# Patient Record
Sex: FEMALE | Race: AMERICAN INDIAN OR ALASKA NATIVE
[De-identification: names, ages, dates, MRNs, and addresses within clinical notes are randomized per-mention and may not be internally consistent; named-entity substitution may affect disease eponyms.]

---

## 2017-12-22 ENCOUNTER — HOSPITAL ENCOUNTER (OUTPATIENT)
Dept: HOSPITAL 5 - TRG | Age: 27
Discharge: HOME | End: 2017-12-22
Attending: OBSTETRICS & GYNECOLOGY
Payer: MEDICAID

## 2017-12-22 VITALS — DIASTOLIC BLOOD PRESSURE: 74 MMHG | SYSTOLIC BLOOD PRESSURE: 136 MMHG

## 2017-12-22 DIAGNOSIS — Z3A.39: ICD-10-CM

## 2017-12-22 DIAGNOSIS — Z87.891: ICD-10-CM

## 2017-12-22 DIAGNOSIS — O47.1: Primary | ICD-10-CM

## 2017-12-22 PROCEDURE — 59025 FETAL NON-STRESS TEST: CPT

## 2017-12-26 ENCOUNTER — HOSPITAL ENCOUNTER (INPATIENT)
Dept: HOSPITAL 5 - TRG | Age: 27
LOS: 3 days | Discharge: HOME | End: 2017-12-29
Attending: OBSTETRICS & GYNECOLOGY | Admitting: OBSTETRICS & GYNECOLOGY
Payer: MEDICAID

## 2017-12-26 DIAGNOSIS — Z3A.40: ICD-10-CM

## 2017-12-26 DIAGNOSIS — Z23: ICD-10-CM

## 2017-12-26 DIAGNOSIS — E66.9: ICD-10-CM

## 2017-12-26 LAB
ALT SERPL-CCNC: 9 UNITS/L (ref 7–56)
BILIRUB UR QL STRIP: (no result)
BLOOD UR QL VISUAL: (no result)
HCT VFR BLD CALC: (no result) % (ref 30.3–42.9)
HCT VFR BLD CALC: 32.9 % (ref 30.3–42.9)
HGB BLD-MCNC: (no result) GM/DL (ref 10.1–14.3)
HGB BLD-MCNC: 10.6 GM/DL (ref 10.1–14.3)
MCH RBC QN AUTO: (no result) PG (ref 28–32)
MCH RBC QN AUTO: 27 PG (ref 28–32)
MCHC RBC AUTO-ENTMCNC: (no result) % (ref 30–34)
MCHC RBC AUTO-ENTMCNC: 32 % (ref 30–34)
MCV RBC AUTO: (no result) FL (ref 79–97)
MCV RBC AUTO: 85 FL (ref 79–97)
MUCOUS THREADS #/AREA URNS HPF: (no result) /HPF
NITRITE UR QL STRIP: (no result)
PH UR STRIP: 6 [PH] (ref 5–7)
PLATELET # BLD: (no result) K/MM3 (ref 140–440)
PLATELET # BLD: 168 K/MM3 (ref 140–440)
RBC # BLD AUTO: (no result) M/MM3 (ref 3.65–5.03)
RBC # BLD AUTO: 3.87 M/MM3 (ref 3.65–5.03)
RBC #/AREA URNS HPF: 4 /HPF (ref 0–6)
URATE SERPL-MCNC: 5.8 MG/DL (ref 3.5–7.6)
UROBILINOGEN UR-MCNC: < 2 MG/DL (ref ?–2)
WBC #/AREA URNS HPF: 5 /HPF (ref 0–6)

## 2017-12-26 PROCEDURE — 85018 HEMOGLOBIN: CPT

## 2017-12-26 PROCEDURE — G0463 HOSPITAL OUTPT CLINIC VISIT: HCPCS

## 2017-12-26 PROCEDURE — 85027 COMPLETE CBC AUTOMATED: CPT

## 2017-12-26 PROCEDURE — 76819 FETAL BIOPHYS PROFIL W/O NST: CPT

## 2017-12-26 PROCEDURE — 82565 ASSAY OF CREATININE: CPT

## 2017-12-26 PROCEDURE — 84550 ASSAY OF BLOOD/URIC ACID: CPT

## 2017-12-26 PROCEDURE — 86850 RBC ANTIBODY SCREEN: CPT

## 2017-12-26 PROCEDURE — 81001 URINALYSIS AUTO W/SCOPE: CPT

## 2017-12-26 PROCEDURE — 84450 TRANSFERASE (AST) (SGOT): CPT

## 2017-12-26 PROCEDURE — 59200 INSERT CERVICAL DILATOR: CPT

## 2017-12-26 PROCEDURE — 84460 ALANINE AMINO (ALT) (SGPT): CPT

## 2017-12-26 PROCEDURE — 86901 BLOOD TYPING SEROLOGIC RH(D): CPT

## 2017-12-26 PROCEDURE — A6250 SKIN SEAL PROTECT MOISTURIZR: HCPCS

## 2017-12-26 PROCEDURE — 99211 OFF/OP EST MAY X REQ PHY/QHP: CPT

## 2017-12-26 PROCEDURE — 85014 HEMATOCRIT: CPT

## 2017-12-26 PROCEDURE — 86592 SYPHILIS TEST NON-TREP QUAL: CPT

## 2017-12-26 PROCEDURE — 83615 LACTATE (LD) (LDH) ENZYME: CPT

## 2017-12-26 PROCEDURE — 88307 TISSUE EXAM BY PATHOLOGIST: CPT

## 2017-12-26 PROCEDURE — 86900 BLOOD TYPING SEROLOGIC ABO: CPT

## 2017-12-26 PROCEDURE — 36415 COLL VENOUS BLD VENIPUNCTURE: CPT

## 2017-12-26 RX ADMIN — SODIUM CHLORIDE, SODIUM LACTATE, POTASSIUM CHLORIDE, AND CALCIUM CHLORIDE SCH MLS/HR: .6; .31; .03; .02 INJECTION, SOLUTION INTRAVENOUS at 18:33

## 2017-12-26 NOTE — ULTRASOUND REPORT
BIOPHYSICAL PROFILE:



INDICATION: Post dates.  Abdominal pain.



COMPARISON: None similar at this institution.



TECHNIQUE:  Transabdominal ultrasound with Doppler interrogation.



2 - Fetal breathing movements



0 - Fetal movements



2 - Fetal posture and tone



2 - Qualitative amniotic fluid volume



6 - TOTAL SCORE OF POSSIBLE 8



Fetal Heart Rate (bpm) 149



CONCLUSION: Findings, as above.

## 2017-12-26 NOTE — HISTORY AND PHYSICAL REPORT
History of Present Illness


Date of examination: 17


Date of admission: 


17 13:55





Chief complaint: 





contractions  


History of present illness: 





Pt is a 27 year old  female  CHAIYTO 17 at 40w4d who presents 

with irregular contractions. She had a BPP that was 6/8 (-2 for movement). She 

denies vaginal bleeding or leakage of fluid. She has had prenatal care at 

[facility] since transfer into care at 34 weeks complicated by 

insufficient prenatal care, h/o trichomonas treated with negative test of cure. 

She is GBS negative. 





Past History


Past Medical History: no pertinent history


Past Surgical History: D&C


GYN History: trichomonas (treated with negative test of cure )


Family/Genetic History: other (unknow; adopted )


Social history: no significant social history





- Obstetrical History


Expected Date of Delivery: 17


Actual Gestation: 40 Week(s) 4 Day(s) 


: 5


Para: 1


Hx # Term Pregnancies: 1


Number of  Pregnancies: 0


Spontaneous Abortions: 1


Induced : 2


Number of Living Children: 1





Medications and Allergies


 Allergies











Allergy/AdvReac Type Severity Reaction Status Date / Time


 


No Known Allergies Allergy   Verified 17 14:43











 Home Medications











 Medication  Instructions  Recorded  Confirmed  Last Taken  Type


 


Prenatal Vit-Fe Fumar-FA [Prenatal 1 tab PO QDAY 17 08:

00 History





Vitamin]     











Active Meds: 


Active Medications





Butorphanol Tartrate (Stadol)  2 mg IV Q2H PRN


   PRN Reason: Pain , Severe (7-10)


Dinoprostone (Cervidil)  10 mg VG ONCE ONE


   Stop: 17 15:51


Ephedrine Sulfate (Ephedrine Sulfate)  10 mg IV Q2M PRN


   PRN Reason: Hypotension


Fentanyl (Sublimaze)  100 mcg IV Q2H PRN


   PRN Reason: Labor Pain


Lactated Ringer's (Lactated Ringers)  1,000 mls @ 125 mls/hr IV AS DIRECT DENY


Oxytocin/Sodium Chloride (Pitocin/Ns 20 Unit/1000ml Drip)  20 units in 1,000 

mls @ 125 mls/hr IV AS DIRECT DENY


Oxytocin/Sodium Chloride (Pitocin/Ns 30 Unit/500ml)  30 units in 500 mls @ 4 mls

/hr IV TITR DENY


   PRN Reason: Protocol


Lidocaine (Xylocaine 2%)  20 ml INFILTRATI ONCE ONE


   Stop: 17 15:51


Mineral Oil (Mineral Oil)  30 ml PO QHS PRN


   PRN Reason: Constipation


Naloxone HCl (Narcan 0.4 Mg/1 Ml)  0.1 mg IV Q2MIN PRN


   PRN Reason: Res Rate </= 8 or 02 SAT < 92%


Ondansetron HCl (Zofran)  4 mg IV Q8H PRN


   PRN Reason: Nausea And Vomiting


Terbutaline Sulfate (Brethine)  0.25 mg SUB-Q ONCE PRN


   PRN Reason: Hyperstimulation/Hypertonicity


Terbutaline Sulfate (Brethine)  0.25 mg IVP ONCE PRN


   PRN Reason: Hyperstimulation/Hypertonicity











Review of Systems


All systems: negative





- Vital Signs


Vital signs: 


 Vital Signs











Temp Pulse Resp BP Pulse Ox


 


 97.5 F L  113 H  18   138/90   100 


 


 17 13:35  17 13:35  17 13:35  17 13:35  17 13:35








 











Temp Pulse Resp BP Pulse Ox


 


 97.5 F L  88   18   138/90   99 


 


 17 13:35  17 15:44  17 13:35  17 13:35  17 15:44














- Physical Exam


Breasts: Positive: deferred


Cardiovascular: Regular rate


Lungs: Positive: Clear to auscultation


Abdomen: Positive: soft (gravid, obese )


Genitourinary (Female): Positive: normal external genitalia


Uterus: Positive: enlarged (gravid )


Extremities: Positive: edema





- Obstetrical


FHR: category 2


Uterine Contraction Monitor Mode: External


Cervical Dilatation: 8


Cervical Effacement Percentage: 80


Fetal station: -1


Uterine Contraction Pattern: Irregular


Uterine Tone Measurement Phase: Resting


Uterine Contraction Intensity: Moderate





Results


Result Diagrams: 


 17 18:09





 17 16:20


All other labs normal.








Assessment and Plan





A: IUP at 40w4d


    Non-reassuring fetal status 


    Active labor


    Obesity


    Insufficient Prenatal Care


    GBS negative





P: Admit to labor and delivery.


    Transitional labor.


    Routine intrapartum care.


    Closely monitor maternal and fetal status.

## 2017-12-27 LAB
HCT VFR BLD CALC: 30.4 % (ref 30.3–42.9)
HGB BLD-MCNC: 10.1 GM/DL (ref 10.1–14.3)

## 2017-12-27 PROCEDURE — 00HU33Z INSERTION OF INFUSION DEVICE INTO SPINAL CANAL, PERCUTANEOUS APPROACH: ICD-10-PCS | Performed by: OBSTETRICS & GYNECOLOGY

## 2017-12-27 PROCEDURE — 0KQM0ZZ REPAIR PERINEUM MUSCLE, OPEN APPROACH: ICD-10-PCS | Performed by: OBSTETRICS & GYNECOLOGY

## 2017-12-27 PROCEDURE — 3E0R3BZ INTRODUCTION OF ANESTHETIC AGENT INTO SPINAL CANAL, PERCUTANEOUS APPROACH: ICD-10-PCS | Performed by: OBSTETRICS & GYNECOLOGY

## 2017-12-27 RX ADMIN — FERROUS SULFATE TAB 325 MG (65 MG ELEMENTAL FE) SCH MG: 325 (65 FE) TAB at 21:44

## 2017-12-27 RX ADMIN — IBUPROFEN SCH MG: 600 TABLET, FILM COATED ORAL at 16:14

## 2017-12-27 RX ADMIN — SODIUM CHLORIDE, SODIUM LACTATE, POTASSIUM CHLORIDE, AND CALCIUM CHLORIDE SCH MLS/HR: .6; .31; .03; .02 INJECTION, SOLUTION INTRAVENOUS at 04:57

## 2017-12-27 RX ADMIN — SODIUM CHLORIDE, SODIUM LACTATE, POTASSIUM CHLORIDE, AND CALCIUM CHLORIDE SCH MLS/HR: .6; .31; .03; .02 INJECTION, SOLUTION INTRAVENOUS at 02:57

## 2017-12-27 RX ADMIN — SODIUM CHLORIDE, SODIUM LACTATE, POTASSIUM CHLORIDE, AND CALCIUM CHLORIDE SCH MLS/HR: .6; .31; .03; .02 INJECTION, SOLUTION INTRAVENOUS at 04:01

## 2017-12-27 RX ADMIN — FERROUS SULFATE TAB 325 MG (65 MG ELEMENTAL FE) SCH MG: 325 (65 FE) TAB at 10:54

## 2017-12-27 RX ADMIN — IBUPROFEN SCH MG: 600 TABLET, FILM COATED ORAL at 10:54

## 2017-12-27 NOTE — ANESTHESIA CONSULTATION
Anesthesia Consult and Med Hx


Date of service: 12/27/17





- Airway


Anesthetic Teeth Evaluation: Good


ROM Head & Neck: Adequate


Mental/Hyoid Distance: Adequate


Mallampati Class: Class II


Intubation Access Assessment: Good





- Pulmonary Exam


CTA: Yes





- Cardiac Exam


Cardiac Exam: No Murmur





- Pre-Operative Health Status


ASA Pre-Surgery Classification: ASA2


Proposed Anesthetic Plan: Epidural





- Pulmonary


Hx Asthma: No


COPD: No


Hx Pneumonia: No





- Cardiovascular System


Hx Hypertension: No





- Central Nervous System


Hx Seizures: No


Hx Psychiatric Problems: No





- Endocrine


Hx Renal Disease: No


Hx End Stage Renal Disease: No


Hx Hypothyroidism: No


Hx Hyperthyroidism: No





- Hematic


Hx Anemia: No


Hx Sickle Cell Disease: No





- Other Systems


Hx Alcohol Use: No

## 2017-12-27 NOTE — PROCEDURE NOTE
OB Delivery Note





- Delivery


Date of Delivery: 17


Surgeon: AARON TURCIOS


Estimated blood loss: other (400 mL)





- Vaginal


Delivery presentation: vertex


Delivery position: OP


Intrapartum events: decreased FHT variability, extend. fetal bradycardia


Delivery induction: cervidil


Delivery augmentation: rupture of membranes, pitocin


Delivery monitor: external FHT, external uterine


Route of delivery: vacuum extraction


Indicators for instrumentation: nonreassuring FHR tracing


Delivery placenta: spontaneous


Delivery cord: 3 umbilical vessels


Episiotomy: none


Delivery laceration: 2nd degree (perineal )


Delivery repair: vicryl


Anesthesia: local, epidural


Delivery comments: 





Pt progressed to complete/complete/+1. Head was noted to be in occiput 

posterior position. Fetal bradycardia noted. [place] vacuum applied and head 

delivered with two pulls no pop offs. Head delivered in direct OP presentation, 

quickly followed by shoulders and body.  placed on maternal abdomen and 

bulb suctioned. Terminal meconium. Cord clamped and cut and handed to NICU 

staff in attendance for vacuum. Cord blood collected. Placenta delivered 

spontaneously. Vagina and perineum explored. Second degree perineal repaired 

with 2-0 Vicryl in a standard fashion.  mL.  





- Infant


  ** A


Apgar at 1 minute: 8


Apgar at 5 minutes: 9


Infant Gender: Male (3766g (8lb 5 oz) @ 0531 am)

## 2017-12-28 PROCEDURE — 3E0234Z INTRODUCTION OF SERUM, TOXOID AND VACCINE INTO MUSCLE, PERCUTANEOUS APPROACH: ICD-10-PCS | Performed by: OBSTETRICS & GYNECOLOGY

## 2017-12-28 RX ADMIN — FERROUS SULFATE TAB 325 MG (65 MG ELEMENTAL FE) SCH MG: 325 (65 FE) TAB at 21:34

## 2017-12-28 RX ADMIN — FERROUS SULFATE TAB 325 MG (65 MG ELEMENTAL FE) SCH MG: 325 (65 FE) TAB at 10:30

## 2017-12-28 RX ADMIN — IBUPROFEN SCH: 600 TABLET, FILM COATED ORAL at 16:00

## 2017-12-28 RX ADMIN — IBUPROFEN SCH: 600 TABLET, FILM COATED ORAL at 21:36

## 2017-12-28 RX ADMIN — IBUPROFEN SCH: 600 TABLET, FILM COATED ORAL at 10:14

## 2017-12-28 NOTE — PROGRESS NOTE
Assessment and Plan





A/P PPD #1 s/p VAVD


doing well


breast feeding


11.2-9.8 iron tabs


continue routine PP orders


d/c home tomorrow 





Subjective





- Subjective


Date of service: 17


Principal diagnosis: s/p VAVD


Patient reports: appetite normal, voiding normally, pain well controlled, flatus

, ambulating normally


: doing well, nursing well





Objective





- Vital Signs


Latest vital signs: 


 Vital Signs











  Temp Pulse Resp BP BP Pulse Ox


 


 17 00:33  98.3 F  77  18   133/86  98


 


 17 07:31  98.1 F  77  18   132/80  98


 


 17 07:12   96 H     98


 


 17 07:08   88   143/82  


 


 17 07:07   90     99


 


 17 07:01   85     98


 


 17 06:56   83     100


 


 17 06:54   81   135/70  


 


 17 06:51   82     99


 


 17 06:46   88     98


 


 17 06:41   89     99


 


 17 06:39   94 H   135/67  


 


 17 06:36   96 H     99


 


 17 06:31   93 H     100


 


 17 06:26   95 H     100


 


 17 06:24   92 H   137/66  


 


 17 06:21   93 H     98


 


 17 06:16   96 H     99


 


 17 06:11   105 H     98


 


 17 06:09   101 H   130/64  


 


 17 06:06   102 H     99








 Intake and Output











 17





 15:59 23:59 07:59


 


Output Total 1100  


 


Balance -1100  


 


Output:   


 


  Urine 1100  


 


    Void 1100  


 


Other:   


 


  Total, Output Amount 700  


 


  # Voids   


 


    Void 1  














- Exam


Breasts: Present: normal


Cardiovascular: Present: Regular rate, Normal S1


Lungs: Present: Clear to auscultation, Normal air movement


Abdomen: Present: normal appearance, soft, normal bowel sounds.  Absent: 

distention, tenderness, guarding


Vulva: both: normal


Uterus: Present: normal, firm, fundal height below umbilicus.  Absent: bogginess

, tenderness


Extremities: Present: normal


Deep Tendon Reflex Grade: Normal +2

## 2017-12-29 VITALS — SYSTOLIC BLOOD PRESSURE: 124 MMHG | DIASTOLIC BLOOD PRESSURE: 79 MMHG

## 2017-12-29 RX ADMIN — FERROUS SULFATE TAB 325 MG (65 MG ELEMENTAL FE) SCH: 325 (65 FE) TAB at 10:25

## 2017-12-29 NOTE — PROGRESS NOTE
Assessment and Plan





A/P PPD #2 s/p VAVD


doing well


breast feeding


11.2-9.8 iron tabs


d/c home tomorrow 


f/u in 4 weeks 





Subjective





- Subjective


Date of service: 17


Principal diagnosis: s/p VAVD


Patient reports: appetite normal, voiding normally, pain well controlled, flatus

, ambulating normally


[place]: doing well, nursing well





Objective





- Vital Signs


Latest vital signs: 


 Vital Signs











  Temp Pulse Resp BP BP Pulse Ox


 


 17 00:35  98.4 F  85  18   138/87  99


 


 17 16:16  98.4 F  82  18  138/86   100


 


 17 16:00    16   


 


 17 08:46  98.0 F  91 H  18  128/91   98








 Intake and Output











 17





 23:59 07:59 15:59


 


Intake Total 360 240 


 


Balance 360 240 


 


Intake:   


 


  Oral 360 240 


 


Other:   


 


  Total, Intake Amount 360 240 


 


  # Voids   


 


    Void 1 1 














- Exam


Breasts: Present: normal


Cardiovascular: Present: Regular rate, Normal S1


Lungs: Present: Clear to auscultation, Normal air movement


Abdomen: Present: normal appearance, soft, normal bowel sounds.  Absent: 

distention, tenderness, guarding


Vulva: both: normal


Uterus: Present: normal, firm, fundal height below umbilicus.  Absent: bogginess

, tenderness


Extremities: Present: normal


Deep Tendon Reflex Grade: Normal +2


Incision: Present: normal

## 2017-12-29 NOTE — DISCHARGE SUMMARY
Providers





- Providers


Date of Admission: 


17 13:55





Date of discharge: 17


Attending physician: 


AARON TURCIOS





 





17 08:13


Consult to Lactation Consultant [CONS] Routine 


   Reason For Exam: assistance with breastfeeding, SNS











Primary care physician: 


AARON TURCIOS








Hospitalization


Reason for admission: active labor


Delivery: vacuum extraction


Episiotomy: none


Laceration: 2nd degree


Other postpartum procedures: none


Postpartum complications: none


Discharge diagnosis: IUP at term delivered


[place] baby: male


Condition at discharge: Good


Disposition: DC-01 TO HOME OR SELFCARE





Plan





- Discharge Medications


Prescriptions: 


Ferrous Sulfate [Feosol 325 MG tab] 325 mg PO BID #60 tablet


Ferrous Sulfate 325 mg PO BID #60 tablet.


Ibuprofen [Motrin] 600 mg PO Q8H PRN #30 tablet


 PRN Reason: Pain


Ibuprofen [Motrin] 800 mg PO Q8HR PRN #30 tablet


 PRN Reason: Pain





- Provider Discharge Summary


Activity: routine, no sex for 6 weeks


Diet: routine


Instructions: routine


Additional instructions: 


[]  Smoking cessation referral if applicable(refer to patient education folder 

for contact #)


[]  Refer to [facility]'s [facility] Booklet








Call your doctor immediately for:


* Fever > 100.5


* Heavy vaginal bleeding ( >1 pad per hour)


* Severe persistent headache


* Shortness of breath


* Reddened, hot, painful area to leg or breast


* Drainage or odor from incision.





* Keep incision clean and dry at all times and follow doctor's instructions 

regarding bathing/showering











- Follow up plan


Follow up: 


AARON TURCIOS MD [Primary Care Provider] - 18

## 2022-09-19 ENCOUNTER — HOSPITAL ENCOUNTER (OUTPATIENT)
Dept: HOSPITAL 5 - TRG | Age: 32
Discharge: HOME | End: 2022-09-19
Attending: OBSTETRICS & GYNECOLOGY
Payer: MEDICAID

## 2022-09-19 VITALS — DIASTOLIC BLOOD PRESSURE: 76 MMHG | SYSTOLIC BLOOD PRESSURE: 120 MMHG

## 2022-09-19 DIAGNOSIS — Z3A.28: ICD-10-CM

## 2022-09-19 DIAGNOSIS — O36.8130: Primary | ICD-10-CM

## 2022-09-19 PROCEDURE — 59025 FETAL NON-STRESS TEST: CPT

## 2022-09-19 PROCEDURE — 76819 FETAL BIOPHYS PROFIL W/O NST: CPT

## 2022-09-19 NOTE — ULTRASOUND REPORT
ULTRASOUND FETAL BIOPHYSICAL PROFILE



INDICATION:  fetal well being.



COMPARISON: None available.



FINDINGS:



Fetal heart rate is 141 beats per minute.



Fetal breathing movement = 2

Gross body movement = 2

Fetal tone = 2

Qualitative amniotic fluid volume = 2





IMPRESSION:

Fetal biophysical profile = 8/8



Signer Name: Erlin Bonner Jr, MD 

Signed: 9/19/2022 12:57 PM

Workstation Name: [id number]
